# Patient Record
Sex: MALE | Race: WHITE | NOT HISPANIC OR LATINO | Employment: OTHER | URBAN - METROPOLITAN AREA
[De-identification: names, ages, dates, MRNs, and addresses within clinical notes are randomized per-mention and may not be internally consistent; named-entity substitution may affect disease eponyms.]

---

## 2017-11-02 NOTE — PATIENT DISCUSSION
BLEPHARITIS, OU: PRESCRIBE WARM COMPRESSES AND EYELID SCRUBS QD-BID, ARTIFICIAL TEARS BID-QID, START MAXITROL OINTMENT QHS. RETURN FOR FOLLOW-UP AS SCHEDULED.

## 2017-11-02 NOTE — PATIENT DISCUSSION
HERPES ZOSTER RIGHT SIDE- NO GLOBE DISEASE. PT WILL BE STARTING VALTREX LATER TODAY ON ADMISSION TO HOSPITAL. VISION IS DOWN DUE TO DRY EYES, SO START ARTIFICIAL TEARS AND BLEPHARITIS TREATMENT. F/U 2 WEEKS.

## 2017-11-02 NOTE — PATIENT DISCUSSION
New Prescription: Maxitrol (neomycin-polymyxin-dexameth): ointment: 3.5-10,000-0.1 mg-unit/g-% 1 a small amount twice a day into both eyes 11-

## 2017-11-14 NOTE — PATIENT DISCUSSION
FUNES'S PALSY, OD:  PRESCIBED ARTIFICIAL TEARS QID AND ERYTHROMYCIN OINTMENT QHS. FOLLOW-UP IN 1 WEEK TO INSURE NO CORNEAL DAMAGE.  ADVISED PT TO TAPERIGHT UPPER LID CLOSED AT NIGHT WHILE SLEEPING

## 2017-11-14 NOTE — PATIENT DISCUSSION
Bell's Palsy Counseling:  I have explained the diagnosis of Bell's Palsy and its pathophysiology. I have explained that this is a temporary condition and stressed the importance of keeping the affected eye adequately lubricated to prevent excessive corneal  dryness or decompensation. The importance of close observation of the affected eye was explained. Return for follow-up as scheduled or sooner if symptoms worsen.

## 2017-11-20 NOTE — PATIENT DISCUSSION
STOPPED VALTREX - due to new dentrites restart valtres 500mg 6 times per day ( at pts request. he does not want 1gram pill)

## 2017-11-20 NOTE — PATIENT DISCUSSION
New Prescription: Valtrex (valacyclovir): tablet: 500 mg 1 tablet as directed as directed by mouth 11-

## 2017-11-20 NOTE — PATIENT DISCUSSION
ADVISED PT TO USE ANAHY THROUGHOUT THE DAY AND TO MAKE SURE HE APPLIES A LOT AT BEDTIME BECAUSE PT CANT SLEEP WITH THE TAPE OVER HIS EYE

## 2017-11-27 NOTE — PATIENT DISCUSSION
FUNES'S PALSY, OD: PRESCIBED ARTIFICIAL TEARS QID AND ERYTHROMYCIN OINTMENT QHS. FOLLOW-UP IN 1 WEEK TO INSURE NO CORNEAL DAMAGE. ADVISED PT TO TAPERIGHT UPPER LID CLOSED AT NIGHT WHILE SLEEPING. NO DENDRITES ON EXAM TODAY AFTER COMPLETING 1 MORE WEEK OF VALTREX.

## 2017-12-21 NOTE — PATIENT DISCUSSION
F/U BELLS PALSY OD- IMPROVEMENT TODAY. HAS BEEN OFF VALTREX AND ORAL PRED. CONT LACRILUBE OD Q 3 HOURS AND QHS. STILL WITH LAGOPHTHALMOS, BUT BETTER. WILL CONTINUE TO FOLLOW.

## 2018-01-12 NOTE — PATIENT DISCUSSION
FUNES'S PALSY, OD:  PRESCIBED ARTIFICIAL TEARS QID AND ERYTHROMYCIN OINTMENT QHS. FOLLOW-UP IN 1 WEEK TO INSURE NO CORNEAL DAMAGE.

## 2018-02-09 NOTE — PATIENT DISCUSSION
BELL'S PALSY, OD S/P MUIR HUNT SYNDROME:  PRESCIBED lacrilube OD q 2 hours. ARTIFICIAL TEARS QID AND ERYTHROMYCIN OINTMENT QHS. FOLLOW-UP IN 1 WEEK TO INSURE NO CORNEAL DAMAGE.   PT STATES HE IS ONLY USING ANAHY BID AND WE ADVISED HE USE IT AT LEAST 4 TIMES

## 2018-02-09 NOTE — PATIENT DISCUSSION
MODERATE DRY EYES: PRESCRIBED DISAPPEARING PRESERVATIVE OR PRESERVATIVE FREE ARTIFICIAL TEARS BID ? QID4-6X A DAY, OU AND THE DAILY INTAKE OF OMEGA-3 DHA/EPA FATTY ACIDS TO HELP RELIEVE SYMPTOMS. ADD NIGHTLY LUBRICATING OINTMENT OR GEL. DISCUSSED INSERTING PUNCTAL PLUGS TODAY. PATIENT WISHES TO HAVE PLUGS PLACED TODAY. IF THIS DOESN'T HELP CAN TRY RESTASIS.

## 2018-03-09 NOTE — PATIENT DISCUSSION
BELL'S PALSY, OD S/P MUIR HUNT SYNDROME: PRESCIBED lacrilube OD q 2 hours. ARTIFICIAL TEARS QID AND ERYTHROMYCIN OINTMENT QHS. FOLLOW-UP IN 3 WEEKs TO INSURE NO CORNEAL DAMAGE. PT STATES HE IS ONLY USING ANAHY qID . INCREASE TO 6 TIMES A DAY.

## 2018-12-13 ENCOUNTER — NEW PATIENT COMPREHENSIVE (OUTPATIENT)
Dept: URBAN - METROPOLITAN AREA CLINIC 36 | Facility: CLINIC | Age: 73
End: 2018-12-13

## 2018-12-13 DIAGNOSIS — H25.813: ICD-10-CM

## 2018-12-13 DIAGNOSIS — E11.9: ICD-10-CM

## 2018-12-13 DIAGNOSIS — H40.023: ICD-10-CM

## 2018-12-13 DIAGNOSIS — H02.881: ICD-10-CM

## 2018-12-13 DIAGNOSIS — H04.123: ICD-10-CM

## 2018-12-13 DIAGNOSIS — H35.3132: ICD-10-CM

## 2018-12-13 DIAGNOSIS — H02.884: ICD-10-CM

## 2018-12-13 PROCEDURE — G8428 CUR MEDS NOT DOCUMENT: HCPCS

## 2018-12-13 PROCEDURE — 3072F LOW RISK FOR RETINOPATHY: CPT

## 2018-12-13 PROCEDURE — G8482 FLU IMMUNIZE ORDER/ADMIN: HCPCS

## 2018-12-13 PROCEDURE — 4040F PNEUMOC VAC/ADMIN/RCVD: CPT

## 2018-12-13 PROCEDURE — 99204 OFFICE O/P NEW MOD 45 MIN: CPT

## 2018-12-13 PROCEDURE — 2022F DILAT RTA XM EVC RTNOPTHY: CPT

## 2018-12-13 PROCEDURE — 1036F TOBACCO NON-USER: CPT

## 2018-12-13 PROCEDURE — G9903 PT SCRN TBCO ID AS NON USER: HCPCS

## 2018-12-13 ASSESSMENT — VISUAL ACUITY
OS_SC: 20/70-1
OD_CC: J2
OS_CC: J2
OS_CC: 20/25
OD_SC: J3
OS_SC: J3
OD_SC: 20/70-1
OD_CC: 20/40-1

## 2018-12-13 ASSESSMENT — TONOMETRY
OD_IOP_MMHG: 14
OS_IOP_MMHG: 14

## 2018-12-21 ENCOUNTER — FOLLOW UP (OUTPATIENT)
Dept: URBAN - METROPOLITAN AREA CLINIC 36 | Facility: CLINIC | Age: 73
End: 2018-12-21

## 2018-12-21 DIAGNOSIS — H40.023: ICD-10-CM

## 2018-12-21 PROCEDURE — 92020 GONIOSCOPY: CPT

## 2018-12-21 PROCEDURE — 76514 ECHO EXAM OF EYE THICKNESS: CPT

## 2018-12-21 PROCEDURE — 92133 CPTRZD OPH DX IMG PST SGM ON: CPT

## 2018-12-21 PROCEDURE — 92012 INTRM OPH EXAM EST PATIENT: CPT

## 2018-12-21 ASSESSMENT — VISUAL ACUITY
OS_CC: J6
OD_CC: J8
OD_CC: 20/30-1
OS_CC: 20/25

## 2018-12-21 ASSESSMENT — PACHYMETRY
OS_CT_UM: 548
OD_CT_UM: 582

## 2018-12-21 ASSESSMENT — TONOMETRY
OD_IOP_MMHG: 13
OS_IOP_MMHG: 14

## 2019-01-15 ENCOUNTER — CATARACT CONSULT (OUTPATIENT)
Dept: URBAN - METROPOLITAN AREA CLINIC 43 | Facility: CLINIC | Age: 74
End: 2019-01-15

## 2019-01-15 VITALS
HEART RATE: 62 BPM | HEIGHT: 60 IN | DIASTOLIC BLOOD PRESSURE: 64 MMHG | RESPIRATION RATE: 14 BRPM | SYSTOLIC BLOOD PRESSURE: 138 MMHG

## 2019-01-15 DIAGNOSIS — E11.9: ICD-10-CM

## 2019-01-15 DIAGNOSIS — H35.30: ICD-10-CM

## 2019-01-15 DIAGNOSIS — H25.812: ICD-10-CM

## 2019-01-15 DIAGNOSIS — H25.811: ICD-10-CM

## 2019-01-15 DIAGNOSIS — H04.123: ICD-10-CM

## 2019-01-15 DIAGNOSIS — H40.023: ICD-10-CM

## 2019-01-15 PROCEDURE — V2799I IMPRIMIS

## 2019-01-15 PROCEDURE — 99214 OFFICE O/P EST MOD 30 MIN: CPT

## 2019-01-15 PROCEDURE — 92136TC INTERFEROMETRY - TECHNICAL COMPONENT

## 2019-01-15 PROCEDURE — 92015 DETERMINE REFRACTIVE STATE: CPT

## 2019-01-15 PROCEDURE — 92134 CPTRZ OPH DX IMG PST SGM RTA: CPT

## 2019-01-15 ASSESSMENT — VISUAL ACUITY
OS_SC: J3
OS_CC: 20/25-2
OD_BAT: <20/400
OD_SC: J3
OS_AM: 20/20-2
OD_CC: 20/40
OS_BAT: <20/400
OD_CC: J8
OS_SC: 20/60
OS_CC: J6
OD_PAM: 20/25
OD_SC: 20/80

## 2019-01-15 ASSESSMENT — TONOMETRY
OS_IOP_MMHG: 14
OD_IOP_MMHG: 14

## 2019-02-04 ENCOUNTER — SURGERY/PROCEDURE (OUTPATIENT)
Dept: URBAN - METROPOLITAN AREA CLINIC 43 | Facility: CLINIC | Age: 74
End: 2019-02-04

## 2019-02-04 ENCOUNTER — PRE-OP/H&P (OUTPATIENT)
Dept: URBAN - METROPOLITAN AREA CLINIC 39 | Facility: CLINIC | Age: 74
End: 2019-02-04

## 2019-02-04 DIAGNOSIS — H40.023: ICD-10-CM

## 2019-02-04 DIAGNOSIS — H04.123: ICD-10-CM

## 2019-02-04 DIAGNOSIS — H25.811: ICD-10-CM

## 2019-02-04 DIAGNOSIS — H25.812: ICD-10-CM

## 2019-02-04 DIAGNOSIS — H35.30: ICD-10-CM

## 2019-02-04 DIAGNOSIS — E11.9: ICD-10-CM

## 2019-02-04 PROCEDURE — 4177F TALK PT/CRGVR RE AREDS PREV: CPT

## 2019-02-04 PROCEDURE — 2022F DILAT RTA XM EVC RTNOPTHY: CPT

## 2019-02-04 PROCEDURE — G9903 PT SCRN TBCO ID AS NON USER: HCPCS

## 2019-02-04 PROCEDURE — G8785 BP SCRN NO PERF AT INTERVAL: HCPCS

## 2019-02-04 PROCEDURE — 3072F LOW RISK FOR RETINOPATHY: CPT

## 2019-02-04 PROCEDURE — 1036F TOBACCO NON-USER: CPT

## 2019-02-04 PROCEDURE — 4040F PNEUMOC VAC/ADMIN/RCVD: CPT

## 2019-02-04 PROCEDURE — 99211T TECH SERVICE

## 2019-02-04 PROCEDURE — 2024F 7 FLD RTA PHOTO EVC RTNOPTHY: CPT

## 2019-02-04 PROCEDURE — G8482 FLU IMMUNIZE ORDER/ADMIN: HCPCS

## 2019-02-04 PROCEDURE — 0517F GLAUCOMA PLAN OF CARE DOCD: CPT

## 2019-02-04 PROCEDURE — 66984 XCAPSL CTRC RMVL W/O ECP: CPT

## 2019-02-04 PROCEDURE — 2026F EYE IMG VALID EVC RTNOPTHY: CPT

## 2019-02-04 PROCEDURE — 2019F DILATED MACUL EXAM DONE: CPT

## 2019-02-04 PROCEDURE — G8428 CUR MEDS NOT DOCUMENT: HCPCS

## 2019-02-04 PROCEDURE — G9974 MAC EXAM PERF: HCPCS

## 2019-02-05 ENCOUNTER — POST OP/EVAL OF SECOND EYE (OUTPATIENT)
Dept: URBAN - METROPOLITAN AREA CLINIC 36 | Facility: CLINIC | Age: 74
End: 2019-02-05

## 2019-02-05 DIAGNOSIS — Z96.1: ICD-10-CM

## 2019-02-05 DIAGNOSIS — E11.9: ICD-10-CM

## 2019-02-05 DIAGNOSIS — H40.023: ICD-10-CM

## 2019-02-05 PROCEDURE — 99024 POSTOP FOLLOW-UP VISIT: CPT

## 2019-02-05 ASSESSMENT — VISUAL ACUITY
OD_SC: 20/60-1
OS_SC: 20/80-1
OS_SC: J6
OD_SC: J4
OD_BAT: 20/80
OD_PH: 20/40
OS_PH: 20/25-1

## 2019-02-05 ASSESSMENT — TONOMETRY
OS_IOP_MMHG: 14
OD_IOP_MMHG: 14

## 2019-02-11 ENCOUNTER — PRE-OP/H&P (OUTPATIENT)
Dept: URBAN - METROPOLITAN AREA CLINIC 39 | Facility: CLINIC | Age: 74
End: 2019-02-11

## 2019-02-11 ENCOUNTER — SURGERY/PROCEDURE (OUTPATIENT)
Dept: URBAN - METROPOLITAN AREA SURGERY 14 | Facility: SURGERY | Age: 74
End: 2019-02-11

## 2019-02-11 DIAGNOSIS — H25.811: ICD-10-CM

## 2019-02-11 DIAGNOSIS — Z96.1: ICD-10-CM

## 2019-02-11 PROCEDURE — 99211T TECH SERVICE

## 2019-02-11 PROCEDURE — 66984 XCAPSL CTRC RMVL W/O ECP: CPT

## 2019-02-11 ASSESSMENT — VISUAL ACUITY
OS_SC: J2
OD_SC: 20/60-2
OS_SC: 20/100
OD_SC: J4

## 2019-02-11 ASSESSMENT — TONOMETRY
OS_IOP_MMHG: 15
OD_IOP_MMHG: 14

## 2019-02-12 ENCOUNTER — CATARACT POST-OP 1-DAY (OUTPATIENT)
Dept: URBAN - METROPOLITAN AREA CLINIC 36 | Facility: CLINIC | Age: 74
End: 2019-02-12

## 2019-02-12 DIAGNOSIS — Z96.1: ICD-10-CM

## 2019-02-12 PROCEDURE — 99024 POSTOP FOLLOW-UP VISIT: CPT

## 2019-02-12 ASSESSMENT — TONOMETRY
OD_IOP_MMHG: 14
OS_IOP_MMHG: 14

## 2019-02-12 ASSESSMENT — VISUAL ACUITY
OS_PH: 20/40
OD_SC: 20/200
OD_SC: J2
OS_SC: J3
OD_PH: 20/40
OS_SC: 20/100

## 2019-02-19 ENCOUNTER — POST-OP CATARACT (OUTPATIENT)
Dept: URBAN - METROPOLITAN AREA CLINIC 36 | Facility: CLINIC | Age: 74
End: 2019-02-19

## 2019-02-19 DIAGNOSIS — Z96.1: ICD-10-CM

## 2019-02-19 PROCEDURE — 99024 POSTOP FOLLOW-UP VISIT: CPT

## 2019-02-19 ASSESSMENT — VISUAL ACUITY
OS_SC: 20/70
OD_SC: 20/200-1

## 2019-02-19 ASSESSMENT — TONOMETRY
OD_IOP_MMHG: 14
OS_IOP_MMHG: 14

## 2019-03-05 ENCOUNTER — POST-OP CATARACT (OUTPATIENT)
Dept: URBAN - METROPOLITAN AREA CLINIC 36 | Facility: CLINIC | Age: 74
End: 2019-03-05

## 2019-03-05 DIAGNOSIS — Z96.1: ICD-10-CM

## 2019-03-05 PROCEDURE — 99024 POSTOP FOLLOW-UP VISIT: CPT

## 2019-03-05 ASSESSMENT — VISUAL ACUITY
OS_PH: 20/40
OD_SC: 20/400
OD_PH: 20/60
OS_SC: J2
OD_SC: J1
OS_SC: 20/200

## 2019-03-05 ASSESSMENT — TONOMETRY
OS_IOP_MMHG: 14
OD_IOP_MMHG: 14

## 2020-12-16 ENCOUNTER — ESTABLISHED COMPREHENSIVE EXAM (OUTPATIENT)
Dept: URBAN - METROPOLITAN AREA CLINIC 36 | Facility: CLINIC | Age: 75
End: 2020-12-16

## 2020-12-16 DIAGNOSIS — H52.7: ICD-10-CM

## 2020-12-16 DIAGNOSIS — H40.023: ICD-10-CM

## 2020-12-16 DIAGNOSIS — Z96.1: ICD-10-CM

## 2020-12-16 DIAGNOSIS — H35.30: ICD-10-CM

## 2020-12-16 DIAGNOSIS — H04.123: ICD-10-CM

## 2020-12-16 DIAGNOSIS — E11.9: ICD-10-CM

## 2020-12-16 PROCEDURE — 92015 DETERMINE REFRACTIVE STATE: CPT

## 2020-12-16 PROCEDURE — 92014 COMPRE OPH EXAM EST PT 1/>: CPT

## 2020-12-16 ASSESSMENT — VISUAL ACUITY
OD_CC: 20/25
OD_SC: 20/400
OD_CC: J5
OS_CC: J5
OD_SC: J3
OS_SC: J3
OS_SC: 20/100
OS_CC: 20/20

## 2020-12-16 ASSESSMENT — TONOMETRY
OD_IOP_MMHG: 16
OS_IOP_MMHG: 16

## 2021-04-15 ENCOUNTER — FOLLOW UP (OUTPATIENT)
Dept: URBAN - METROPOLITAN AREA CLINIC 36 | Facility: CLINIC | Age: 76
End: 2021-04-15

## 2021-04-15 DIAGNOSIS — H40.023: ICD-10-CM

## 2021-04-15 PROCEDURE — 92012 INTRM OPH EXAM EST PATIENT: CPT

## 2021-04-15 PROCEDURE — 92133 CPTRZD OPH DX IMG PST SGM ON: CPT

## 2021-04-15 ASSESSMENT — VISUAL ACUITY
OD_CC: 20/25
OS_CC: 20/25

## 2021-04-15 ASSESSMENT — TONOMETRY
OS_IOP_MMHG: 20
OD_IOP_MMHG: 19

## 2021-11-02 ENCOUNTER — RETINA CONSULT (OUTPATIENT)
Dept: URBAN - METROPOLITAN AREA CLINIC 46 | Facility: CLINIC | Age: 76
End: 2021-11-02

## 2021-11-02 DIAGNOSIS — E11.3293: ICD-10-CM

## 2021-11-02 DIAGNOSIS — H35.732: ICD-10-CM

## 2021-11-02 DIAGNOSIS — H35.3221: ICD-10-CM

## 2021-11-02 DIAGNOSIS — D31.32: ICD-10-CM

## 2021-11-02 DIAGNOSIS — H35.3112: ICD-10-CM

## 2021-11-02 DIAGNOSIS — H35.721: ICD-10-CM

## 2021-11-02 PROCEDURE — 92235 FLUORESCEIN ANGRPH MLTIFRAME: CPT

## 2021-11-02 PROCEDURE — 92134 CPTRZ OPH DX IMG PST SGM RTA: CPT

## 2021-11-02 PROCEDURE — 99214 OFFICE O/P EST MOD 30 MIN: CPT

## 2021-11-02 PROCEDURE — C9257 BEVACIZUMAB INJECTION: HCPCS

## 2021-11-02 PROCEDURE — 92250 FUNDUS PHOTOGRAPHY W/I&R: CPT

## 2021-11-02 PROCEDURE — 67028 INJECTION EYE DRUG: CPT

## 2021-11-02 ASSESSMENT — TONOMETRY
OD_IOP_MMHG: 13
OS_IOP_MMHG: 14

## 2021-11-02 ASSESSMENT — VISUAL ACUITY
OS_CC: 20/30-2
OD_CC: 20/30-2

## 2021-12-14 ENCOUNTER — ESTABLISHED PATIENT (OUTPATIENT)
Dept: URBAN - METROPOLITAN AREA CLINIC 36 | Facility: CLINIC | Age: 76
End: 2021-12-14

## 2021-12-14 DIAGNOSIS — H35.732: ICD-10-CM

## 2021-12-14 DIAGNOSIS — H35.721: ICD-10-CM

## 2021-12-14 DIAGNOSIS — E11.3293: ICD-10-CM

## 2021-12-14 DIAGNOSIS — H35.3221: ICD-10-CM

## 2021-12-14 DIAGNOSIS — H35.363: ICD-10-CM

## 2021-12-14 DIAGNOSIS — D31.32: ICD-10-CM

## 2021-12-14 DIAGNOSIS — H35.3211: ICD-10-CM

## 2021-12-14 PROCEDURE — 92134 CPTRZ OPH DX IMG PST SGM RTA: CPT

## 2021-12-14 PROCEDURE — 92273 FULL FIELD ERG W/I&R: CPT

## 2021-12-14 PROCEDURE — 6702850 BILATERAL INTRAVITREAL INJECTION

## 2021-12-14 PROCEDURE — 99214 OFFICE O/P EST MOD 30 MIN: CPT

## 2021-12-14 PROCEDURE — C9257 BEVACIZUMAB INJECTION: HCPCS

## 2021-12-14 ASSESSMENT — VISUAL ACUITY
OS_CC: 20/25-2
OD_CC: 20/60+1

## 2021-12-14 ASSESSMENT — TONOMETRY
OS_IOP_MMHG: 11
OD_IOP_MMHG: 9

## 2021-12-16 ENCOUNTER — COMPREHENSIVE EXAM (OUTPATIENT)
Dept: URBAN - METROPOLITAN AREA CLINIC 36 | Facility: CLINIC | Age: 76
End: 2021-12-16

## 2021-12-16 DIAGNOSIS — H04.123: ICD-10-CM

## 2021-12-16 DIAGNOSIS — H52.7: ICD-10-CM

## 2021-12-16 DIAGNOSIS — Z96.1: ICD-10-CM

## 2021-12-16 DIAGNOSIS — H40.013: ICD-10-CM

## 2021-12-16 PROCEDURE — 92015 DETERMINE REFRACTIVE STATE: CPT

## 2021-12-16 PROCEDURE — 92012 INTRM OPH EXAM EST PATIENT: CPT

## 2021-12-16 PROCEDURE — 92083 EXTENDED VISUAL FIELD XM: CPT

## 2021-12-16 ASSESSMENT — VISUAL ACUITY
OS_CC: J5
OS_SC: J6
OD_SC: 20/200
OD_CC: J10
OD_CC: 20/60-2
OD_SC: J6
OS_CC: 20/30
OS_SC: 20/80

## 2021-12-16 ASSESSMENT — TONOMETRY
OD_IOP_MMHG: 14
OS_IOP_MMHG: 14

## 2022-02-08 ENCOUNTER — ESTABLISHED PATIENT (OUTPATIENT)
Dept: URBAN - METROPOLITAN AREA CLINIC 36 | Facility: CLINIC | Age: 77
End: 2022-02-08

## 2022-02-08 DIAGNOSIS — H35.3211: ICD-10-CM

## 2022-02-08 DIAGNOSIS — H35.3221: ICD-10-CM

## 2022-02-08 PROCEDURE — C9257 BEVACIZUMAB INJECTION: HCPCS

## 2022-02-08 PROCEDURE — 6702850 BILATERAL INTRAVITREAL INJECTION

## 2022-02-08 PROCEDURE — 92134 CPTRZ OPH DX IMG PST SGM RTA: CPT

## 2022-02-08 ASSESSMENT — VISUAL ACUITY
OD_CC: 20/70+2
OS_CC: 20/40+1

## 2022-02-08 ASSESSMENT — TONOMETRY
OS_IOP_MMHG: 13
OD_IOP_MMHG: 15

## 2022-03-23 ENCOUNTER — COMPREHENSIVE EXAM (OUTPATIENT)
Dept: URBAN - METROPOLITAN AREA CLINIC 36 | Facility: CLINIC | Age: 77
End: 2022-03-23

## 2022-03-23 DIAGNOSIS — H04.123: ICD-10-CM

## 2022-03-23 DIAGNOSIS — H35.721: ICD-10-CM

## 2022-03-23 DIAGNOSIS — H35.363: ICD-10-CM

## 2022-03-23 DIAGNOSIS — H35.732: ICD-10-CM

## 2022-03-23 DIAGNOSIS — H35.3221: ICD-10-CM

## 2022-03-23 DIAGNOSIS — H52.7: ICD-10-CM

## 2022-03-23 DIAGNOSIS — H26.493: ICD-10-CM

## 2022-03-23 DIAGNOSIS — H35.3211: ICD-10-CM

## 2022-03-23 DIAGNOSIS — E11.3293: ICD-10-CM

## 2022-03-23 PROCEDURE — 92014 COMPRE OPH EXAM EST PT 1/>: CPT

## 2022-03-23 PROCEDURE — 92015 DETERMINE REFRACTIVE STATE: CPT

## 2022-03-23 ASSESSMENT — VISUAL ACUITY
OS_CC: J10
OD_SC: J3
OD_SC: 20/400
OD_CC: J10
OS_CC: 20/50+2
OS_PH: 20/40
OS_SC: J3
OS_SC: 20/80-1
OD_CC: 20/50-1

## 2022-03-23 ASSESSMENT — TONOMETRY
OD_IOP_MMHG: 15
OS_IOP_MMHG: 14

## 2022-03-29 ENCOUNTER — ESTABLISHED PATIENT (OUTPATIENT)
Dept: URBAN - METROPOLITAN AREA CLINIC 39 | Facility: CLINIC | Age: 77
End: 2022-03-29

## 2022-03-29 DIAGNOSIS — H26.493: ICD-10-CM

## 2022-03-29 PROCEDURE — 92014 COMPRE OPH EXAM EST PT 1/>: CPT

## 2022-03-30 ENCOUNTER — CLINIC PROCEDURE ONLY (OUTPATIENT)
Dept: URBAN - METROPOLITAN AREA CLINIC 36 | Facility: CLINIC | Age: 77
End: 2022-03-30

## 2022-03-30 DIAGNOSIS — H35.3231: ICD-10-CM

## 2022-03-30 DIAGNOSIS — E11.3293: ICD-10-CM

## 2022-03-30 PROCEDURE — 92235 FLUORESCEIN ANGRPH MLTIFRAME: CPT

## 2022-03-30 PROCEDURE — 92273 FULL FIELD ERG W/I&R: CPT

## 2022-03-30 PROCEDURE — 6702850 INTRAVITREAL NJX PHARMACOLOGIC AGT SPX BILATERAL

## 2022-03-30 PROCEDURE — 92287 ANT SGM IMG IR FLRSCN ANGRPH: CPT

## 2022-03-30 PROCEDURE — C9257 BEVACIZUMAB INJECTION: HCPCS

## 2022-03-30 PROCEDURE — 92134 CPTRZ OPH DX IMG PST SGM RTA: CPT

## 2022-03-30 PROCEDURE — 92250 FUNDUS PHOTOGRAPHY W/I&R: CPT

## 2022-03-30 ASSESSMENT — VISUAL ACUITY
OD_CC: 20/40-2
OS_CC: 20/25-2

## 2022-03-30 ASSESSMENT — TONOMETRY
OD_IOP_MMHG: 10
OS_IOP_MMHG: 10

## 2022-04-11 ENCOUNTER — CONSULTATION/EVALUATION (OUTPATIENT)
Dept: URBAN - METROPOLITAN AREA CLINIC 39 | Facility: CLINIC | Age: 77
End: 2022-04-11

## 2022-04-11 ENCOUNTER — SURGERY/PROCEDURE (OUTPATIENT)
Dept: URBAN - METROPOLITAN AREA SURGERY 14 | Facility: SURGERY | Age: 77
End: 2022-04-11

## 2022-04-11 DIAGNOSIS — H26.493: ICD-10-CM

## 2022-04-11 DIAGNOSIS — H35.3231: ICD-10-CM

## 2022-04-11 PROCEDURE — 92014 COMPRE OPH EXAM EST PT 1/>: CPT

## 2022-04-11 PROCEDURE — 6682150 YAG CAPSULOTOMY

## 2022-04-11 ASSESSMENT — VISUAL ACUITY
OD_BAT: 20/80
OS_CC: 20/25-1
OS_BAT: 20/400
OD_CC: 20/30-1

## 2022-04-11 ASSESSMENT — TONOMETRY
OD_IOP_MMHG: 17
OS_IOP_MMHG: 15

## 2022-11-08 ENCOUNTER — ESTABLISHED PATIENT (OUTPATIENT)
Dept: URBAN - METROPOLITAN AREA CLINIC 43 | Facility: CLINIC | Age: 77
End: 2022-11-08

## 2022-11-08 DIAGNOSIS — E11.3293: ICD-10-CM

## 2022-11-08 DIAGNOSIS — H35.363: ICD-10-CM

## 2022-11-08 DIAGNOSIS — D31.32: ICD-10-CM

## 2022-11-08 DIAGNOSIS — H35.3231: ICD-10-CM

## 2022-11-08 DIAGNOSIS — H35.30: ICD-10-CM

## 2022-11-08 DIAGNOSIS — H35.733: ICD-10-CM

## 2022-11-08 PROCEDURE — 67028 INJECTION EYE DRUG: CPT

## 2022-11-08 PROCEDURE — 92287 ANT SGM IMG IR FLRSCN ANGRPH: CPT

## 2022-11-08 PROCEDURE — 92134 CPTRZ OPH DX IMG PST SGM RTA: CPT

## 2022-11-08 PROCEDURE — 92273 FULL FIELD ERG W/I&R: CPT

## 2022-11-08 PROCEDURE — 92242 FLUORESCEIN&ICG ANGIOGRAPHY: CPT

## 2022-11-08 PROCEDURE — 99214 OFFICE O/P EST MOD 30 MIN: CPT

## 2022-11-08 ASSESSMENT — VISUAL ACUITY
OS_CC: 20/20
OD_CC: 20/30

## 2022-11-08 ASSESSMENT — TONOMETRY
OD_IOP_MMHG: 11
OS_IOP_MMHG: 10

## 2022-12-21 ENCOUNTER — CLINIC PROCEDURE ONLY (OUTPATIENT)
Dept: URBAN - METROPOLITAN AREA CLINIC 36 | Facility: CLINIC | Age: 77
End: 2022-12-21

## 2022-12-21 PROCEDURE — 92250 FUNDUS PHOTOGRAPHY W/I&R: CPT

## 2022-12-21 PROCEDURE — 67028 INJECTION EYE DRUG: CPT

## 2022-12-21 ASSESSMENT — TONOMETRY
OS_IOP_MMHG: 13
OD_IOP_MMHG: 16

## 2022-12-21 ASSESSMENT — VISUAL ACUITY
OS_CC: 20/30-3
OD_CC: 20/40-2

## 2023-11-15 ENCOUNTER — ESTABLISHED PATIENT (OUTPATIENT)
Dept: URBAN - METROPOLITAN AREA CLINIC 36 | Facility: CLINIC | Age: 78
End: 2023-11-15

## 2023-11-15 DIAGNOSIS — H35.30: ICD-10-CM

## 2023-11-15 DIAGNOSIS — H35.363: ICD-10-CM

## 2023-11-15 DIAGNOSIS — E11.3293: ICD-10-CM

## 2023-11-15 DIAGNOSIS — H35.733: ICD-10-CM

## 2023-11-15 DIAGNOSIS — H35.3231: ICD-10-CM

## 2023-11-15 PROCEDURE — 92287 ANT SGM IMG IR FLRSCN ANGRPH: CPT

## 2023-11-15 PROCEDURE — 99214 OFFICE O/P EST MOD 30 MIN: CPT

## 2023-11-15 PROCEDURE — 67028 INJECTION EYE DRUG: CPT

## 2023-11-15 PROCEDURE — 92242 FLUORESCEIN&ICG ANGIOGRAPHY: CPT

## 2023-11-15 PROCEDURE — 92134 CPTRZ OPH DX IMG PST SGM RTA: CPT

## 2023-11-15 ASSESSMENT — VISUAL ACUITY
OD_PH: 20/60-1
OD_CC: 20/70-1
OS_CC: 20/25+1

## 2023-11-15 ASSESSMENT — TONOMETRY
OS_IOP_MMHG: 12
OD_IOP_MMHG: 13

## 2023-11-29 ENCOUNTER — FOLLOW UP (OUTPATIENT)
Dept: URBAN - METROPOLITAN AREA CLINIC 36 | Facility: CLINIC | Age: 78
End: 2023-11-29

## 2023-11-29 DIAGNOSIS — H40.023: ICD-10-CM

## 2023-11-29 PROCEDURE — 92012 INTRM OPH EXAM EST PATIENT: CPT

## 2023-11-29 PROCEDURE — 92133 CPTRZD OPH DX IMG PST SGM ON: CPT

## 2023-11-29 ASSESSMENT — TONOMETRY
OS_IOP_MMHG: 13
OD_IOP_MMHG: 12

## 2023-11-29 ASSESSMENT — VISUAL ACUITY
OS_CC: 20/25+2
OD_CC: 20/60-2

## 2024-02-15 ENCOUNTER — EMERGENCY VISIT (OUTPATIENT)
Dept: URBAN - METROPOLITAN AREA CLINIC 36 | Facility: CLINIC | Age: 79
End: 2024-02-15

## 2024-02-15 DIAGNOSIS — E11.3293: ICD-10-CM

## 2024-02-15 DIAGNOSIS — G45.9: ICD-10-CM

## 2024-02-15 DIAGNOSIS — H53.8: ICD-10-CM

## 2024-02-15 DIAGNOSIS — H35.3231: ICD-10-CM

## 2024-02-15 PROCEDURE — 92134 CPTRZ OPH DX IMG PST SGM RTA: CPT

## 2024-02-15 PROCEDURE — 92014 COMPRE OPH EXAM EST PT 1/>: CPT

## 2024-02-15 ASSESSMENT — VISUAL ACUITY
OD_CC: 20/60-2
OS_CC: 20/25-1

## 2024-02-15 ASSESSMENT — TONOMETRY
OD_IOP_MMHG: 13
OS_IOP_MMHG: 14

## 2024-02-19 ENCOUNTER — ESTABLISHED PATIENT (OUTPATIENT)
Dept: URBAN - METROPOLITAN AREA CLINIC 43 | Facility: CLINIC | Age: 79
End: 2024-02-19

## 2024-02-19 DIAGNOSIS — G45.9: ICD-10-CM

## 2024-02-19 DIAGNOSIS — H35.733: ICD-10-CM

## 2024-02-19 DIAGNOSIS — H40.023: ICD-10-CM

## 2024-02-19 DIAGNOSIS — H53.8: ICD-10-CM

## 2024-02-19 DIAGNOSIS — E11.3293: ICD-10-CM

## 2024-02-19 DIAGNOSIS — H35.30: ICD-10-CM

## 2024-02-19 DIAGNOSIS — H35.3231: ICD-10-CM

## 2024-02-19 DIAGNOSIS — H35.363: ICD-10-CM

## 2024-02-19 PROCEDURE — 67028 INJECTION EYE DRUG: CPT

## 2024-02-19 PROCEDURE — 92287 ANT SGM IMG IR FLRSCN ANGRPH: CPT

## 2024-02-19 PROCEDURE — 92242 FLUORESCEIN&ICG ANGIOGRAPHY: CPT

## 2024-02-19 PROCEDURE — 92273 FULL FIELD ERG W/I&R: CPT

## 2024-02-19 PROCEDURE — 92134 CPTRZ OPH DX IMG PST SGM RTA: CPT

## 2024-02-19 PROCEDURE — 99214 OFFICE O/P EST MOD 30 MIN: CPT

## 2024-02-19 ASSESSMENT — VISUAL ACUITY
OD_CC: 20/50-1
OS_CC: 20/25

## 2024-02-19 ASSESSMENT — TONOMETRY
OS_IOP_MMHG: 10
OD_IOP_MMHG: 10

## 2024-03-05 ENCOUNTER — COMPREHENSIVE EXAM (OUTPATIENT)
Dept: URBAN - METROPOLITAN AREA CLINIC 36 | Facility: CLINIC | Age: 79
End: 2024-03-05

## 2024-03-05 DIAGNOSIS — H40.023: ICD-10-CM

## 2024-03-05 DIAGNOSIS — G45.9: ICD-10-CM

## 2024-03-05 DIAGNOSIS — H35.3231: ICD-10-CM

## 2024-03-05 DIAGNOSIS — E11.3293: ICD-10-CM

## 2024-03-05 DIAGNOSIS — H35.363: ICD-10-CM

## 2024-03-05 DIAGNOSIS — H52.7: ICD-10-CM

## 2024-03-05 DIAGNOSIS — H35.733: ICD-10-CM

## 2024-03-05 DIAGNOSIS — H35.30: ICD-10-CM

## 2024-03-05 DIAGNOSIS — H53.8: ICD-10-CM

## 2024-03-05 PROCEDURE — 92015 DETERMINE REFRACTIVE STATE: CPT

## 2024-03-05 PROCEDURE — 92014 COMPRE OPH EXAM EST PT 1/>: CPT

## 2024-03-05 PROCEDURE — 92133 CPTRZD OPH DX IMG PST SGM ON: CPT

## 2024-03-05 ASSESSMENT — VISUAL ACUITY
OD_PH: 20/70-1
OS_SC: 20/200
OS_CC: 20/25
OD_CC: J12
OD_SC: 20/200
OS_CC: J2
OD_CC: 20/80

## 2024-03-05 ASSESSMENT — TONOMETRY
OD_IOP_MMHG: 13
OS_IOP_MMHG: 13

## 2024-04-03 ENCOUNTER — ESTABLISHED PATIENT (OUTPATIENT)
Dept: URBAN - METROPOLITAN AREA CLINIC 36 | Facility: CLINIC | Age: 79
End: 2024-04-03

## 2024-04-03 DIAGNOSIS — H35.3231: ICD-10-CM

## 2024-04-03 DIAGNOSIS — E11.3293: ICD-10-CM

## 2024-04-03 DIAGNOSIS — H35.733: ICD-10-CM

## 2024-04-03 DIAGNOSIS — H35.30: ICD-10-CM

## 2024-04-03 DIAGNOSIS — H35.363: ICD-10-CM

## 2024-04-03 PROCEDURE — 92250 FUNDUS PHOTOGRAPHY W/I&R: CPT

## 2024-04-03 PROCEDURE — 67028 INJECTION EYE DRUG: CPT | Mod: 50,RT

## 2024-04-03 PROCEDURE — 92134 CPTRZ OPH DX IMG PST SGM RTA: CPT | Mod: NC

## 2024-04-03 PROCEDURE — 99214 OFFICE O/P EST MOD 30 MIN: CPT | Mod: 25

## 2024-04-03 ASSESSMENT — VISUAL ACUITY
OD_CC: 20/40
OS_CC: 20/25+1
OD_PH: 20/30

## 2024-04-03 ASSESSMENT — TONOMETRY
OS_IOP_MMHG: 09
OD_IOP_MMHG: 09

## 2024-11-01 ENCOUNTER — FOLLOW UP (OUTPATIENT)
Dept: URBAN - METROPOLITAN AREA CLINIC 36 | Facility: CLINIC | Age: 79
End: 2024-11-01

## 2024-11-01 DIAGNOSIS — H40.023: ICD-10-CM

## 2024-11-01 PROCEDURE — 92012 INTRM OPH EXAM EST PATIENT: CPT

## 2024-11-01 PROCEDURE — 92083 EXTENDED VISUAL FIELD XM: CPT

## 2024-11-13 ENCOUNTER — COMPREHENSIVE EXAM (OUTPATIENT)
Dept: URBAN - METROPOLITAN AREA CLINIC 36 | Facility: CLINIC | Age: 79
End: 2024-11-13

## 2024-11-13 DIAGNOSIS — H35.733: ICD-10-CM

## 2024-11-13 DIAGNOSIS — E11.3293: ICD-10-CM

## 2024-11-13 DIAGNOSIS — H35.30: ICD-10-CM

## 2024-11-13 DIAGNOSIS — H35.3231: ICD-10-CM

## 2024-11-13 DIAGNOSIS — H04.123: ICD-10-CM

## 2024-11-13 DIAGNOSIS — H35.363: ICD-10-CM

## 2024-11-13 PROCEDURE — 99214 OFFICE O/P EST MOD 30 MIN: CPT | Mod: 25

## 2024-11-13 PROCEDURE — 92242 FLUORESCEIN&ICG ANGIOGRAPHY: CPT

## 2024-11-13 PROCEDURE — 67028 INJECTION EYE DRUG: CPT | Mod: 50,RT

## 2024-11-13 PROCEDURE — 92134 CPTRZ OPH DX IMG PST SGM RTA: CPT

## 2024-11-13 PROCEDURE — J0178PRE EYLEA PREFILLED SYRINGE: Mod: JZ,RT

## 2024-11-13 PROCEDURE — J0178PRE EYLEA PREFILLED SYRINGE: Mod: JZ,LT

## 2024-11-13 PROCEDURE — 92287 ANT SGM IMG IR FLRSCN ANGRPH: CPT

## 2025-01-08 ENCOUNTER — CLINIC PROCEDURE ONLY (OUTPATIENT)
Age: 80
End: 2025-01-08

## 2025-01-08 DIAGNOSIS — H35.3231: ICD-10-CM

## 2025-01-08 DIAGNOSIS — H35.733: ICD-10-CM

## 2025-01-08 PROCEDURE — J0178PRE EYLEA PREFILLED SYRINGE: Mod: JZ,RT

## 2025-01-08 PROCEDURE — J0178PRE EYLEA PREFILLED SYRINGE: Mod: JZ,LT

## 2025-01-08 PROCEDURE — 92250 FUNDUS PHOTOGRAPHY W/I&R: CPT

## 2025-01-08 PROCEDURE — 67028 INJECTION EYE DRUG: CPT | Mod: 50,RT

## 2025-03-05 ENCOUNTER — COMPREHENSIVE EXAM (OUTPATIENT)
Age: 80
End: 2025-03-05

## 2025-03-05 DIAGNOSIS — H35.733: ICD-10-CM

## 2025-03-05 DIAGNOSIS — H35.3231: ICD-10-CM

## 2025-03-05 DIAGNOSIS — E11.3293: ICD-10-CM

## 2025-03-05 PROCEDURE — 67028 INJECTION EYE DRUG: CPT | Mod: 50,RT

## 2025-03-05 PROCEDURE — J0178PRE EYLEA PREFILLED SYRINGE: Mod: JZ,LT

## 2025-03-05 PROCEDURE — 92134 CPTRZ OPH DX IMG PST SGM RTA: CPT

## 2025-03-05 PROCEDURE — J0178PRE EYLEA PREFILLED SYRINGE: Mod: JZ,RT

## 2025-03-05 PROCEDURE — 92242 FLUORESCEIN&ICG ANGIOGRAPHY: CPT

## 2025-03-05 PROCEDURE — 99213 OFFICE O/P EST LOW 20 MIN: CPT | Mod: 25
